# Patient Record
Sex: FEMALE | Race: WHITE
[De-identification: names, ages, dates, MRNs, and addresses within clinical notes are randomized per-mention and may not be internally consistent; named-entity substitution may affect disease eponyms.]

---

## 2018-04-20 ENCOUNTER — HOSPITAL ENCOUNTER (OUTPATIENT)
Dept: HOSPITAL 89 - MRI | Age: 43
End: 2018-04-20
Attending: SURGERY
Payer: COMMERCIAL

## 2018-04-20 DIAGNOSIS — R22.2: Primary | ICD-10-CM

## 2018-04-20 PROCEDURE — 74183 MRI ABD W/O CNTR FLWD CNTR: CPT

## 2018-04-20 NOTE — RADIOLOGY IMAGING REPORT
FACILITY: [facility] [place] 

 

PATIENT NAME: Yolette Laureano

: 1975

MR: [id number]

V: [id number]

EXAM DATE: 

ORDERING PHYSICIAN: JOHN ULLRICH

TECHNOLOGIST: 

 

Location: [facility] [place]

Patient: Yolette Laureano

: 1975

MRN: [id number]

Visit/Account:[id number]

Date of Sevice:  2018

 

ACCESSION #: [id number]

 

ABDOMEN W W/O CONTRAST

 

HISTORY:  Abdominal wall mass

 

ADDITIONAL HISTORY:  None.

 

TECHNIQUE:  TECHNIQUE:  Multiplanar multisequence magnetic resonance imaging of the abdomen with and 
without intravenous contrast.

 

CONTRAST:  15 mL of MultiHance

 

COMPARISON:  MR abdomen 2017

 

FINDINGS:

Visualized lung bases: Grossly unremarkable.

 

Liver: Negative.

 

Gallbladder: Surgically absent no evidence of biliary ductal dilatation

 

Bile ducts: Nondistended and unremarkable.

 

Spleen: Negative.

 

Adrenal glands: Negative.

 

Pancreas: Negative.

 

Kidneys: Negative.

 

Vessels/spaces/nodes: No bulky adenopathy or ascities.

 

Visualized GI: Grossly unremarkable.

 

Bones/soft tissues: When compared to the prior study reidentified is an enhancing infiltrative mass a
long the left ventral abdominal wall involving the left rectus muscle extending anteriorly into the s
ubcutaneous tissues.  There is enhancement of the underlying rectus sheath as well similar to the safia
or study.  The mass unchanged in size measuring approximately 11.6 cm in transverse dimension, 3.7 cm
 AP dimension and 12.7 cm in craniocaudad direction.  The mass bows the rectus sheath posteriorly alt
brandt no direct invasion into the intraperitoneal cavity

 

IMPRESSION:

 

Compared the prior MR of 2017 again noted is the enhancing infiltrative mass along the l
eft ventral abdominal wall involving the left rectus sheath extending anteriorly into the subcutaneou
s tissues with enhancement of the underlying rectus sheath.  The mass appears relatively unchanged in
 size as detailed above

As previously noted the differential diagnosis would include scar/fibrous tissue versus desmoid tumor
 versus endometriosis.  More aggressive malignancy again is not totally excluded.

 

Report Dictated By: Allegra Chávez MD at 2018 2:51 PM

 

Report E-Signed By: Allegra Chávez MD  at 2018 3:21 PM

 

WSN:[id number]

## 2018-07-19 ENCOUNTER — HOSPITAL ENCOUNTER (OUTPATIENT)
Dept: HOSPITAL 89 - MRI | Age: 43
End: 2018-07-19
Attending: SURGERY
Payer: COMMERCIAL

## 2018-07-19 DIAGNOSIS — Z90.49: Primary | ICD-10-CM

## 2018-07-19 DIAGNOSIS — Z90.710: ICD-10-CM

## 2018-07-19 PROCEDURE — 74183 MRI ABD W/O CNTR FLWD CNTR: CPT

## 2018-07-19 PROCEDURE — 72197 MRI PELVIS W/O & W/DYE: CPT

## 2018-07-19 NOTE — RADIOLOGY IMAGING REPORT
FACILITY: [facility] 

 

PATIENT NAME: Yolette Laureano

: 1975

MR: [id number]

V: [id number]

EXAM DATE: 

ORDERING PHYSICIAN: JOHN ULLRICH

TECHNOLOGIST: 

 

Location: [facility]

Patient: Yolette Laureano

: 1975

MRN: [id number]

Visit/Account:[id number]

Date of Sevice:  2018

 

ACCESSION #: [id number]

 

EXAMINATION: MR Abdomen and Pelvis Without and With Contrast  2018 7:34 AM

 

HISTORY: Abdominal mass w/change in location and new onset tenderness

 

TECHNIQUE: Multiplanar multisequence MR imaging of the abdomen and of the pelvis was done before and 
after intravenous contrast administration.

Contrast:   20 mL of IV MultiHance.

 

 

COMPARISON STUDIES:   2018.

 

FINDINGS:

Liver / biliary: Prior cholecystectomy.  CBD caliber is unchanged.  Liver is negative.

Pancreas: negative

Spleen: Stable small posterior accessory splenule.

Adrenal glands: negative

Kidneys / retroperitoneum: negative

Pelvic  structures: Prior hysterectomy.  Ovaries also appear to be absent.

 

Bowel / peritoneum / mesenteries: negative

 

Vessels: Incidental circumaortic left renal vein with an inferior retroaortic component.

 

Musculoskeletal / Body wall: Poorly defined infiltrative enhancing mass in the left ventral abdominal
 wall is again shown.  There are 3 areas of primary enhancement, one just to left of midline in the s
uperior aspect, secondary to inferolateral to this on the left and the third area further inferolater
al to that towards the left.  The overall process measures roughly 14 cm craniocaudal by about 9 cm t
ransverse by 2.4 cm AP.  The overall appearance is little changed comparing back to 2017, with 
some differences in measurement due to indistinct margins of the process.  The left inferolateral asp
ect of this has the deepest extension of enhancement down to the posterior surface of the abdominal w
all, but there is no definite direct extension deep to the abdominal wall fascial plane, although the
 fascial margin is indistinct (pelvis images series 2, image 8 and postcontrast series 11 image 4).  
No new satellite finding is demonstrated elsewhere.  Visualized bone marrow signal is unremarkable.

 

Lymph node assessment: negative

 

Lower chest: negative

 

IMPRESSION:

Poorly defined infiltrative mass in the left ventral abdominal wall is little changed comparing back 
to 2017 as discussed above.

 

Report Dictated By: Mac Parker MD at 2018 2:18 PM

 

Report E-Signed By: Mac Parker MD  at 2018 3:06 PM

 

WSN:[email or web address]

## 2018-07-19 NOTE — RADIOLOGY IMAGING REPORT
FACILITY: [facility] 

 

PATIENT NAME: Yolette Laureano

: 1975

MR: [id number]

V: [id number]

EXAM DATE: 

ORDERING PHYSICIAN: JOHN ULLRICH

TECHNOLOGIST: 

 

Location: [facility] - [place], [place]

Patient: Yolette Laureano

: 1975

MRN: [id number]

Visit/Account:[id number]

Date of Sevice:  2018

 

ACCESSION #: [id number]

 

EXAMINATION: MR Abdomen and Pelvis Without and With Contrast  2018 7:34 AM

 

HISTORY: Abdominal mass w/change in location and new onset tenderness

 

TECHNIQUE: Multiplanar multisequence MR imaging of the abdomen and of the pelvis was done before and 
after intravenous contrast administration.

Contrast:   20 mL of IV MultiHance.

 

 

COMPARISON STUDIES:   2018.

 

FINDINGS:

Liver / biliary: Prior cholecystectomy.  CBD caliber is unchanged.  Liver is negative.

Pancreas: negative

Spleen: Stable small posterior accessory splenule.

Adrenal glands: negative

Kidneys / retroperitoneum: negative

Pelvic  structures: Prior hysterectomy.  Ovaries also appear to be absent.

 

Bowel / peritoneum / mesenteries: negative

 

Vessels: Incidental circumaortic left renal vein with an inferior retroaortic component.

 

Musculoskeletal / Body wall: Poorly defined infiltrative enhancing mass in the left ventral abdominal
 wall is again shown.  There are 3 areas of primary enhancement, one just to left of midline in the s
uperior aspect, secondary to inferolateral to this on the left and the third area further inferolater
al to that towards the left.  The overall process measures roughly 14 cm craniocaudal by about 9 cm t
ransverse by 2.4 cm AP.  The overall appearance is little changed comparing back to 2017, with 
some differences in measurement due to indistinct margins of the process.  The left inferolateral asp
ect of this has the deepest extension of enhancement down to the posterior surface of the abdominal w
all, but there is no definite direct extension deep to the abdominal wall fascial plane, although the
 fascial margin is indistinct (pelvis images series 2, image 8 and postcontrast series 11 image 4).  
No new satellite finding is demonstrated elsewhere.  Visualized bone marrow signal is unremarkable.

 

Lymph node assessment: negative

 

Lower chest: negative

 

IMPRESSION:

Poorly defined infiltrative mass in the left ventral abdominal wall is little changed comparing back 
to 2017 as discussed above.

 

Report Dictated By: Mac Parker MD at 2018 2:18 PM

 

Report E-Signed By: Mac Parker MD  at 2018 3:06 PM

 

WSN:[email or web address]

## 2019-03-19 ENCOUNTER — HOSPITAL ENCOUNTER (OUTPATIENT)
Dept: HOSPITAL 89 - MRI | Age: 44
End: 2019-03-19
Attending: SURGERY
Payer: COMMERCIAL

## 2019-03-19 DIAGNOSIS — Z90.49: ICD-10-CM

## 2019-03-19 DIAGNOSIS — K83.8: Primary | ICD-10-CM

## 2019-03-19 PROCEDURE — 74183 MRI ABD W/O CNTR FLWD CNTR: CPT

## 2019-03-19 NOTE — RADIOLOGY IMAGING REPORT
FACILITY: [facility] 

 

PATIENT NAME: Yolette Laureano

: 1975

MR: [id number]

V: [id number]

EXAM DATE: 

ORDERING PHYSICIAN: JOHN ULLRICH

TECHNOLOGIST: 

 

Location: [facility] [place]

Patient: Yolette Laureano

: 1975

MRN: [id number]

Visit/Account:[id number]

Date of Sevice:  3/19/2019

 

ACCESSION #: [id number]

 

MR ABDOMEN W & W/O CON

 

HISTORY:  Abdominal wall mass.

 

TECHNIQUE:  Multiplanar multisequence magnetic resonance imaging of the abdomen without and with intr
avenous contrast.

 

CONTRAST:  15 mL MultiHance IV

 

COMPARISON:  MRI 2018, CT 2017

 

FINDINGS:

Visualized lung bases:  Negative.

 

Liver: Diminutive right portal vein with caudate and left hepatic lobe hypertrophy, unchanged.  Morph
ology otherwise unremarkable and no MR evident steatosis or discrete hepatic lesion.

 

Gallbladder: Surgically absent.

 

Bile ducts: Extrahepatic biliary tree prominent but unchanged and likely chronic post cholecystectomy
.

 

Spleen:  Small accessory splenule posterior, normal variant.

 

Adrenals:  Negative.

 

Pancreas:  Pancreas divisum, normal variant, without ductal obstruction or sequela of pancreatitis.

 

Kidneys/:  Negative.

 

Visualized GI:  Sequela of subtotal colectomy.

 

Vessels/spaces/nodes:  No adenopathy.  Circumaortic left renal vein, normal variant.

 

Bones/soft tissues:  Fatty atrophy of the rectus musculature bilateral.  Sequela of prior ventral wal
l incisions/surgery.  Several thick-walled fluid-filled foci persist within the ventral wall and with
 surrounding edema.  These include collections approximately 11-12 cm above the umbilicus at midline 
measuring 1.4 x 2.1 x 2.8 cm, approximately 4-5 cm above the umbilicus and just to the left of midlin
e measuring 1.0 x 1.1 x 3.0 cm and just lateral to umbilicus measuring approximately 1.8 x 2.1 x 6.3 
cm.  The smallest and most superior collection is within the deep subcutaneous tissues between the re
ctus sheaths.  The larger collections are within the subcutaneous tissues and extending into the left
 rectus sheath allowing for differences in imaging in measurement technique.  Although a marking con
ce indicating patient's site of symptoms overlies the lateral aspects of the larger two collections, 
there is been no significant interval change in their appearance and with surrounding ill-defined enh
ancement following contrast administration.  Mild disc degenerative changes lumbar spine with slight 
mid to inferior lumbar levocurvature.

 

IMPRESSION:

Nonspecific changes of the left upper quadrant ventral wall detailed above and grossly unchanged when
 compared to prior exams dating back to at least 2017.  Given the stability of these findings, the
y are favored to represent chronic postsurgical changes and although superimposed infection would be 
difficult to exclude, there is no new surrounding edema or enhancement to otherwise suggest such by t
his exam.

 

Report Dictated By: Darell Cervantes MD at 3/19/2019 2:23 PM

 

Report E-Signed By: Darell Cervantes MD  at 3/19/2019 2:58 PM

 

WSN:[id number]

## 2019-04-24 ENCOUNTER — HOSPITAL ENCOUNTER (OUTPATIENT)
Dept: HOSPITAL 89 - ZZGRANDUC | Age: 44
End: 2019-04-24
Attending: NURSE PRACTITIONER
Payer: COMMERCIAL

## 2019-04-24 DIAGNOSIS — N63.20: Primary | ICD-10-CM

## 2019-04-24 LAB — PLATELET COUNT, AUTOMATED: 273 K/UL (ref 150–450)

## 2019-04-24 PROCEDURE — 82374 ASSAY BLOOD CARBON DIOXIDE: CPT

## 2019-04-24 PROCEDURE — 82310 ASSAY OF CALCIUM: CPT

## 2019-04-24 PROCEDURE — 82565 ASSAY OF CREATININE: CPT

## 2019-04-24 PROCEDURE — 84132 ASSAY OF SERUM POTASSIUM: CPT

## 2019-04-24 PROCEDURE — 84075 ASSAY ALKALINE PHOSPHATASE: CPT

## 2019-04-24 PROCEDURE — 85025 COMPLETE CBC W/AUTO DIFF WBC: CPT

## 2019-04-24 PROCEDURE — 82040 ASSAY OF SERUM ALBUMIN: CPT

## 2019-04-24 PROCEDURE — 84155 ASSAY OF PROTEIN SERUM: CPT

## 2019-04-24 PROCEDURE — 82947 ASSAY GLUCOSE BLOOD QUANT: CPT

## 2019-04-24 PROCEDURE — 84450 TRANSFERASE (AST) (SGOT): CPT

## 2019-04-24 PROCEDURE — 84520 ASSAY OF UREA NITROGEN: CPT

## 2019-04-24 PROCEDURE — 82247 BILIRUBIN TOTAL: CPT

## 2019-04-24 PROCEDURE — 82435 ASSAY OF BLOOD CHLORIDE: CPT

## 2019-04-24 PROCEDURE — 84295 ASSAY OF SERUM SODIUM: CPT

## 2019-04-24 PROCEDURE — 84460 ALANINE AMINO (ALT) (SGPT): CPT

## 2019-04-25 ENCOUNTER — HOSPITAL ENCOUNTER (OUTPATIENT)
Dept: HOSPITAL 89 - US | Age: 44
Discharge: HOME | End: 2019-04-25
Attending: NURSE PRACTITIONER
Payer: COMMERCIAL

## 2019-04-25 DIAGNOSIS — N63.20: Primary | ICD-10-CM

## 2019-04-25 PROCEDURE — 77062 BREAST TOMOSYNTHESIS BI: CPT

## 2019-04-25 PROCEDURE — 77066 DX MAMMO INCL CAD BI: CPT

## 2019-04-25 NOTE — RADIOLOGY IMAGING REPORT
FACILITY: [facility] 

 

PATIENT NAME: AGUILA MARTIN

: [id number]

MR: [id number]

V: [id number]

EXAM DATE: [id number]

ORDERING PHYSICIAN: MARTÍNEZ GOLD

TECHNOLOGIST: Yamile Louis RDMS

 

PROCEDURE:US LEFT BREAST

 

COMPARISON:None.

 

INDICATIONS:Left breast swelling and pain with anathema  

 

FINDINGS:  

From the 3 o'clock 4 o'clock and 5 o'clock positions of the Left breast 

there is increased echogensity of lobular breast tissue which appears 

different than the remainder of the breast tissue. There are several 

mildly dilated ducts. There is no demonstration of an abscess. There is 

mild skin thickening in this location. Given the relatively acute 

history this may represent an area of mastitis and cellulites. 

Differential diagnosis would also include inflammatory breast cancer 

therefore if the patient does not respond to antibiotic therapy 

Ultrasound guided core biopsy is recommended.

  

DIAGNOSTIC CATEGORY 3--PROBABLY BENIGN FINDING.  

 

RECOMMENDATIONS:

ONE MONTH FOLLOW-UP ULTRASOUND: LEFT BREAST.    

 

 

IMPRESSION:

BIRADS 3: Probably benign finding. 

A short term interval follow-up Left breast Ultrasound is recommended 

following antibiotic therapy. If the patient does not respond to 

antibiotic therapy a Ultrasound guided core biopsy of the area of 

swelling in the 3-5 o'clock position of the Left breast recommended to 

exclude inflammatory breast cancer.

The findings were discussed with Sedrick Murrell at approximately 

9:45am on 19. 

 

 

 

 

Dictated by:  Allegra Chávez M.D. on 2019 at 10:00   

Transcribed by: FIX on 2019 at 15:07    

Approved by:  Allegra Chávez M.D. on 2019 at 15:47   

[facility]

## 2019-04-29 NOTE — RADIOLOGY IMAGING REPORT
FACILITY: [facility] 

 

PATIENT NAME: AGUILA MARTIN

: [id number]

MR: [id number]

V: [id number]

EXAM DATE: [id number]

ORDERING PHYSICIAN: MARTÍNEZ GOLD

TECHNOLOGIST: Sandie Chatman

 

PROCEDURE:BILATERAL DIAGNOSTIC DIGITAL MAMMOGRAM WITH CAD ASSISTED 

INTERPRETATION & 3D TOMOSYNTHESIS 

 

COMPARISON:Prior mammograms 13, 07, 10/13/06.

 

INDICATIONS:Painful swollen Left breast

 

FINDINGS:  

There are scattered areas of fibroglandular density throughout the 

breasts. There is an asymmetry in the anterior depth of the Right 

breast just posterior to the mid nipple line on the Right MLO view. 

This appears more prominent when compared to the prior studies. There 

is increased density in the mid and posterior depth of the Left breast 

posterior to mid nipple line on the Left CC view and also posterior to 

mid nipple line on the Left MLO view that appears more prominent when 

compared to the prior study. This would account for the recent 

sonographic findings of probable edema. 

 

DIAGNOSTIC CATEGORY 3--PROBABLY BENIGN FINDING.  

 

RECOMMENDATIONS:

ONE MONTH FOLLOW-UP ULTRASOUND: LEFT BREAST.    

 

IMPRESSION:

BIRADS 3: Probably benign finding. 

Area of increased density posterior to mid nipple line and mid and 

posterior depth of the Left breast correspond to the area of edema seen 

on the recent Left breast Ultrasound. As noted in the Ultrasound report 

this maybe secondary to mastitis. A short interval follow-up Left 

breast Ultrasound is recommended and antibiotic treatment and to 

exclude possibility of inflammatory carcinoma. If the patient does not 

respond to therapies Ultrasound guided core biopsy recommended of the 

Left breast.

 

 

 

 

 

Dictated by:  Allegra Chávez M.D. on 2019 at 8:17   

Transcribed by: FIX on 2019 at 9:49    

Approved by:  Allegra Chávez M.D. on 2019 at 13:05   

[facility]